# Patient Record
Sex: FEMALE | Race: WHITE | NOT HISPANIC OR LATINO | URBAN - METROPOLITAN AREA
[De-identification: names, ages, dates, MRNs, and addresses within clinical notes are randomized per-mention and may not be internally consistent; named-entity substitution may affect disease eponyms.]

---

## 2020-07-27 ENCOUNTER — APPOINTMENT (RX ONLY)
Dept: URBAN - METROPOLITAN AREA CLINIC 349 | Facility: CLINIC | Age: 25
Setting detail: DERMATOLOGY
End: 2020-07-27

## 2020-07-27 DIAGNOSIS — D22 MELANOCYTIC NEVI: ICD-10-CM

## 2020-07-27 DIAGNOSIS — B07.8 OTHER VIRAL WARTS: ICD-10-CM

## 2020-07-27 PROBLEM — D22.72 MELANOCYTIC NEVI OF LEFT LOWER LIMB, INCLUDING HIP: Status: ACTIVE | Noted: 2020-07-27

## 2020-07-27 PROCEDURE — ? RECOMMENDATIONS

## 2020-07-27 PROCEDURE — ? COUNSELING

## 2020-07-27 PROCEDURE — 99202 OFFICE O/P NEW SF 15 MIN: CPT | Mod: 25

## 2020-07-27 PROCEDURE — 17110 DESTRUCTION B9 LES UP TO 14: CPT

## 2020-07-27 PROCEDURE — ? BENIGN DESTRUCTION

## 2020-07-27 ASSESSMENT — LOCATION DETAILED DESCRIPTION DERM
LOCATION DETAILED: LEFT LATERAL DISTAL PRETIBIAL REGION
LOCATION DETAILED: LEFT KNEE
LOCATION DETAILED: RIGHT ELBOW

## 2020-07-27 ASSESSMENT — LOCATION SIMPLE DESCRIPTION DERM
LOCATION SIMPLE: RIGHT ELBOW
LOCATION SIMPLE: LEFT KNEE
LOCATION SIMPLE: LEFT PRETIBIAL REGION

## 2020-07-27 ASSESSMENT — LOCATION ZONE DERM
LOCATION ZONE: ARM
LOCATION ZONE: LEG

## 2020-07-27 NOTE — PROCEDURE: BENIGN DESTRUCTION
Treatment Number (Will Not Render If 0): 0
Add 52 Modifier (Optional): no
Medical Necessity Information: It is in your best interest to select a reason for this procedure from the list below. All of these items fulfill various CMS LCD requirements except the new and changing color options.
Post-Care Instructions: I reviewed with the patient in detail post-care instructions. Patient is to wear sunprotection, and avoid picking at any of the treated lesions. Pt may apply Vaseline to crusted or scabbing areas.
Detail Level: Detailed
Medical Necessity Clause: This procedure was medically necessary because the lesions that were treated were:
Consent: The patient's consent was obtained including but not limited to risks of crusting, scabbing, blistering, scarring, darker or lighter pigmentary change, recurrence, incomplete removal and infection.

## 2020-08-27 ENCOUNTER — APPOINTMENT (RX ONLY)
Dept: URBAN - METROPOLITAN AREA CLINIC 349 | Facility: CLINIC | Age: 25
Setting detail: DERMATOLOGY
End: 2020-08-27

## 2020-08-27 DIAGNOSIS — L90.5 SCAR CONDITIONS AND FIBROSIS OF SKIN: ICD-10-CM

## 2020-08-27 DIAGNOSIS — B07.8 OTHER VIRAL WARTS: ICD-10-CM | Status: IMPROVED

## 2020-08-27 PROCEDURE — ? RECOMMENDATIONS

## 2020-08-27 PROCEDURE — ? COUNSELING

## 2020-08-27 PROCEDURE — 17110 DESTRUCTION B9 LES UP TO 14: CPT

## 2020-08-27 PROCEDURE — ? BENIGN DESTRUCTION

## 2020-08-27 ASSESSMENT — LOCATION SIMPLE DESCRIPTION DERM
LOCATION SIMPLE: RIGHT ELBOW
LOCATION SIMPLE: LEFT KNEE
LOCATION SIMPLE: LEFT KNEE

## 2020-08-27 ASSESSMENT — LOCATION DETAILED DESCRIPTION DERM
LOCATION DETAILED: LEFT KNEE
LOCATION DETAILED: RIGHT ELBOW
LOCATION DETAILED: LEFT KNEE

## 2020-08-27 ASSESSMENT — LOCATION ZONE DERM
LOCATION ZONE: LEG
LOCATION ZONE: LEG
LOCATION ZONE: ARM

## 2020-08-27 NOTE — PROCEDURE: BENIGN DESTRUCTION
Detail Level: Detailed
Medical Necessity Clause: This procedure was medically necessary because the lesions that were treated were:
Include Z78.9 (Other Specified Conditions Influencing Health Status) As An Associated Diagnosis?: No
Anesthesia Volume In Cc: 0
Consent: The patient's consent was obtained including but not limited to risks of crusting, scabbing, blistering, scarring, darker or lighter pigmentary change, recurrence, incomplete removal and infection.
Medical Necessity Information: It is in your best interest to select a reason for this procedure from the list below. All of these items fulfill various CMS LCD requirements except the new and changing color options.
Post-Care Instructions: I reviewed with the patient in detail post-care instructions. Patient is to wear sunprotection, and avoid picking at any of the treated lesions. Pt may apply Vaseline to crusted or scabbing areas.

## 2020-10-05 ENCOUNTER — APPOINTMENT (RX ONLY)
Dept: URBAN - METROPOLITAN AREA CLINIC 349 | Facility: CLINIC | Age: 25
Setting detail: DERMATOLOGY
End: 2020-10-05

## 2020-10-05 DIAGNOSIS — B07.8 OTHER VIRAL WARTS: ICD-10-CM

## 2020-10-05 DIAGNOSIS — L90.5 SCAR CONDITIONS AND FIBROSIS OF SKIN: ICD-10-CM

## 2020-10-05 PROCEDURE — 17110 DESTRUCTION B9 LES UP TO 14: CPT

## 2020-10-05 PROCEDURE — ? RECOMMENDATIONS

## 2020-10-05 PROCEDURE — ? BENIGN DESTRUCTION

## 2020-10-05 PROCEDURE — 99213 OFFICE O/P EST LOW 20 MIN: CPT | Mod: 25

## 2020-10-05 PROCEDURE — ? COUNSELING

## 2020-10-05 ASSESSMENT — LOCATION ZONE DERM
LOCATION ZONE: LEG
LOCATION ZONE: LEG

## 2020-10-05 ASSESSMENT — LOCATION DETAILED DESCRIPTION DERM
LOCATION DETAILED: LEFT KNEE
LOCATION DETAILED: LEFT KNEE

## 2020-10-05 ASSESSMENT — LOCATION SIMPLE DESCRIPTION DERM
LOCATION SIMPLE: LEFT KNEE
LOCATION SIMPLE: LEFT KNEE

## 2020-10-05 NOTE — PROCEDURE: BENIGN DESTRUCTION
Post-Care Instructions: I reviewed with the patient in detail post-care instructions. Patient is to wear sunprotection, and avoid picking at any of the treated lesions. Pt may apply Vaseline to crusted or scabbing areas.
Render Post-Care Instructions In Note?: no
Detail Level: Detailed
Anesthesia Volume In Cc: 0
Medical Necessity Clause: This procedure was medically necessary because the lesions that were treated were:
Consent: The patient's consent was obtained including but not limited to risks of crusting, scabbing, blistering, scarring, darker or lighter pigmentary change, recurrence, incomplete removal and infection.
Medical Necessity Information: It is in your best interest to select a reason for this procedure from the list below. All of these items fulfill various CMS LCD requirements except the new and changing color options.

## 2021-10-19 ENCOUNTER — TRANSCRIBE ORDER (OUTPATIENT)
Dept: SCHEDULING | Age: 26
End: 2021-10-19

## 2021-10-19 DIAGNOSIS — H54.7 ALTERATION IN VISION: ICD-10-CM

## 2021-10-19 DIAGNOSIS — G43.919 INTRACTABLE MIGRAINE WITHOUT STATUS MIGRAINOSUS: Primary | ICD-10-CM

## 2021-10-19 DIAGNOSIS — R20.0 RIGHT ARM NUMBNESS: ICD-10-CM

## 2021-10-29 ENCOUNTER — HOSPITAL ENCOUNTER (OUTPATIENT)
Dept: MRI IMAGING | Age: 26
Discharge: HOME | End: 2021-10-29
Payer: COMMERCIAL

## 2021-10-29 DIAGNOSIS — H54.7 ALTERATION IN VISION: ICD-10-CM

## 2021-10-29 DIAGNOSIS — R20.0 RIGHT ARM NUMBNESS: ICD-10-CM

## 2021-10-29 DIAGNOSIS — G43.919 INTRACTABLE MIGRAINE WITHOUT STATUS MIGRAINOSUS: ICD-10-CM

## 2021-10-29 PROCEDURE — 70553 MRI BRAIN STEM W/O & W/DYE: CPT

## 2021-10-29 PROCEDURE — A9576 INJ PROHANCE MULTIPACK: HCPCS

## 2021-10-29 PROCEDURE — 74011250636 HC RX REV CODE- 250/636

## 2021-10-29 RX ORDER — SODIUM CHLORIDE 0.9 % (FLUSH) 0.9 %
10 SYRINGE (ML) INJECTION
Status: COMPLETED | OUTPATIENT
Start: 2021-10-29 | End: 2021-10-29

## 2021-10-29 RX ADMIN — Medication 10 ML: at 19:23

## 2021-10-29 RX ADMIN — GADOTERIDOL 15 ML: 279.3 INJECTION, SOLUTION INTRAVENOUS at 19:23

## 2022-02-28 ENCOUNTER — HOSPITAL ENCOUNTER (OUTPATIENT)
Dept: LAB | Age: 27
Discharge: HOME | End: 2022-02-28
Payer: COMMERCIAL

## 2022-02-28 DIAGNOSIS — G43.411 INTRACTABLE HEMIPLEGIC MIGRAINE WITH STATUS MIGRAINOSUS: ICD-10-CM

## 2022-02-28 PROCEDURE — 36415 COLL VENOUS BLD VENIPUNCTURE: CPT

## 2022-02-28 PROCEDURE — 86148 ANTI-PHOSPHOLIPID ANTIBODY: CPT

## 2022-02-28 PROCEDURE — 85302 CLOT INHIBIT PROT C ANTIGEN: CPT

## 2022-03-02 LAB — PROT C AG PPP IA-ACNC: 110 % (ref 60–150)

## 2022-03-08 NOTE — PROGRESS NOTES
Positive anticardiolipin antibody test. Need to start ASA 81 mg a day. I placed a referral to hematology. Called patient but had to leave voice mail.

## 2022-03-09 LAB
CARDIOLIPIN IGA SER IA-ACNC: <9 APL U/ML (ref 0–11)
CARDIOLIPIN IGG SER IA-ACNC: 30 GPL U/ML (ref 0–14)
CARDIOLIPIN IGM SER IA-ACNC: 18 MPL U/ML (ref 0–12)
PE AB, IGA, 823219: 0 U/ML
PE AB, IGG, 823220: 9.7 U/ML
PE AB, IGM, 823221: 10.9 U/ML
PHOSPHATIDYLGLYCEROL IGA, 823222: 1.5 U/ML
PHOSPHATIDYLGLYCEROL IGG, 823223: 14.3 U/ML
PHOSPHATIDYLGLYCEROL IGM, 823224: 3.7 U/ML
PHOSPHATIDYLINOSITOL IGA, 823216: 1.5 U/ML
PHOSPHATIDYLINOSITOL IGG, 823217: 10.8 U/ML
PHOSPHATIDYLINOSITOL IGM, 823218: 4 U/ML
PS IGA SER-ACNC: <1 APS UNITS (ref 0–19)
PS IGG SER-ACNC: 9 UNITS (ref 0–30)
PS IGM SER-ACNC: 108 UNITS (ref 0–30)

## 2022-03-18 ENCOUNTER — HOSPITAL ENCOUNTER (OUTPATIENT)
Dept: LAB | Age: 27
Discharge: HOME | End: 2022-03-18
Payer: COMMERCIAL

## 2022-03-18 PROCEDURE — 36415 COLL VENOUS BLD VENIPUNCTURE: CPT

## 2022-03-18 PROCEDURE — 85305 CLOT INHIBIT PROT S TOTAL: CPT

## 2022-03-18 PROCEDURE — 81241 F5 GENE: CPT

## 2022-03-19 LAB
PROT S AG ACT/NOR PPP IA: 63 % (ref 60–150)
PROT S FREE AG ACT/NOR PPP IA: 84 % (ref 61–136)

## 2022-03-28 LAB — F5 GENE MUT ANL BLD/T: NORMAL

## 2022-04-05 ENCOUNTER — HOSPITAL ENCOUNTER (OUTPATIENT)
Dept: LAB | Age: 27
Discharge: HOME | End: 2022-04-05
Payer: COMMERCIAL

## 2022-04-05 DIAGNOSIS — H53.9 VISUAL CHANGES: ICD-10-CM

## 2022-04-05 DIAGNOSIS — R47.01 APHASIA: ICD-10-CM

## 2022-04-05 DIAGNOSIS — G81.90 HEMIPARESIS, UNSPECIFIED HEMIPARESIS ETIOLOGY, UNSPECIFIED LATERALITY (HCC): ICD-10-CM

## 2022-04-05 DIAGNOSIS — D68.61 APS (ANTIPHOSPHOLIPID SYNDROME) (HCC): ICD-10-CM

## 2022-04-05 DIAGNOSIS — G43.109 COMPLICATED MIGRAINE: ICD-10-CM

## 2022-04-05 LAB
ABO + RH BLD: NORMAL
ALBUMIN SERPL-MCNC: 3.8 G/DL (ref 3.5–5)
ALBUMIN/GLOB SERPL: 1.1 {RATIO} (ref 1.2–3.5)
ALP SERPL-CCNC: 60 U/L (ref 50–136)
ALT SERPL-CCNC: 49 U/L (ref 12–65)
ANION GAP SERPL CALC-SCNC: 5 MMOL/L (ref 7–16)
APTT PPP: 33.9 SEC (ref 24.1–35.1)
AST SERPL-CCNC: 26 U/L (ref 15–37)
BASOPHILS # BLD: 0 K/UL (ref 0–0.2)
BASOPHILS NFR BLD: 0 % (ref 0–2)
BILIRUB SERPL-MCNC: 0.5 MG/DL (ref 0.2–1.1)
BUN SERPL-MCNC: 10 MG/DL (ref 6–23)
CALCIUM SERPL-MCNC: 9.6 MG/DL (ref 8.3–10.4)
CHLORIDE SERPL-SCNC: 105 MMOL/L (ref 98–107)
CO2 SERPL-SCNC: 26 MMOL/L (ref 21–32)
CREAT SERPL-MCNC: 0.8 MG/DL (ref 0.6–1)
CRP SERPL-MCNC: <0.3 MG/DL (ref 0–0.9)
D DIMER PPP FEU-MCNC: 0.64 UG/ML(FEU)
DIFFERENTIAL METHOD BLD: NORMAL
EOSINOPHIL # BLD: 0.1 K/UL (ref 0–0.8)
EOSINOPHIL NFR BLD: 1 % (ref 0.5–7.8)
ERYTHROCYTE [DISTWIDTH] IN BLOOD BY AUTOMATED COUNT: 11.9 % (ref 11.9–14.6)
ERYTHROCYTE [SEDIMENTATION RATE] IN BLOOD: 3 MM/HR (ref 0–20)
FERRITIN SERPL-MCNC: 20 NG/ML (ref 8–388)
FIBRINOGEN PPP-MCNC: 304 MG/DL (ref 190–501)
FOLATE SERPL-MCNC: 14 NG/ML (ref 3.1–17.5)
GLOBULIN SER CALC-MCNC: 3.4 G/DL (ref 2.3–3.5)
GLUCOSE SERPL-MCNC: 114 MG/DL (ref 65–100)
HCT VFR BLD AUTO: 42 % (ref 35.8–46.3)
HGB BLD-MCNC: 14.4 G/DL (ref 11.7–15.4)
IMM GRANULOCYTES # BLD AUTO: 0 K/UL (ref 0–0.5)
IMM GRANULOCYTES NFR BLD AUTO: 0 % (ref 0–5)
INR PPP: 1.1
IRON SATN MFR SERPL: 32 %
IRON SERPL-MCNC: 106 UG/DL (ref 35–150)
LDH SERPL L TO P-CCNC: 161 U/L (ref 100–190)
LYMPHOCYTES # BLD: 2.3 K/UL (ref 0.5–4.6)
LYMPHOCYTES NFR BLD: 23 % (ref 13–44)
Lab: NORMAL
MCH RBC QN AUTO: 31.7 PG (ref 26.1–32.9)
MCHC RBC AUTO-ENTMCNC: 34.3 G/DL (ref 31.4–35)
MCV RBC AUTO: 92.5 FL (ref 79.6–97.8)
MONOCYTES # BLD: 0.5 K/UL (ref 0.1–1.3)
MONOCYTES NFR BLD: 5 % (ref 4–12)
NEUTS SEG # BLD: 7 K/UL (ref 1.7–8.2)
NEUTS SEG NFR BLD: 70 % (ref 43–78)
NRBC # BLD: 0 K/UL (ref 0–0.2)
PLATELET # BLD AUTO: 313 K/UL (ref 150–450)
PMV BLD AUTO: 9.6 FL (ref 9.4–12.3)
POTASSIUM SERPL-SCNC: 3.5 MMOL/L (ref 3.5–5.1)
PROT SERPL-MCNC: 7.2 G/DL (ref 6.3–8.2)
PROTHROMBIN TIME: 13.9 SEC (ref 12.6–14.5)
RBC # BLD AUTO: 4.54 M/UL (ref 4.05–5.2)
REFERENCE LAB,REFLB: NORMAL
SODIUM SERPL-SCNC: 136 MMOL/L (ref 136–145)
TEST DESCRIPTION:,ATST: NORMAL
THROMBIN TIME: 16.4 SECS (ref 15.4–17.9)
TIBC SERPL-MCNC: 327 UG/DL (ref 250–450)
VIT B12 SERPL-MCNC: 268 PG/ML (ref 193–986)
WBC # BLD AUTO: 10 K/UL (ref 4.3–11.1)

## 2022-04-05 PROCEDURE — 85247 CLOT FACTOR VIII MULTIMETRIC: CPT

## 2022-04-05 PROCEDURE — 82607 VITAMIN B-12: CPT

## 2022-04-05 PROCEDURE — 85300 ANTITHROMBIN III ACTIVITY: CPT

## 2022-04-05 PROCEDURE — 80053 COMPREHEN METABOLIC PANEL: CPT

## 2022-04-05 PROCEDURE — 86038 ANTINUCLEAR ANTIBODIES: CPT

## 2022-04-05 PROCEDURE — 85384 FIBRINOGEN ACTIVITY: CPT

## 2022-04-05 PROCEDURE — 85305 CLOT INHIBIT PROT S TOTAL: CPT

## 2022-04-05 PROCEDURE — 85306 CLOT INHIBIT PROT S FREE: CPT

## 2022-04-05 PROCEDURE — 85610 PROTHROMBIN TIME: CPT

## 2022-04-05 PROCEDURE — 85670 THROMBIN TIME PLASMA: CPT

## 2022-04-05 PROCEDURE — 82746 ASSAY OF FOLIC ACID SERUM: CPT

## 2022-04-05 PROCEDURE — 81240 F2 GENE: CPT

## 2022-04-05 PROCEDURE — 85379 FIBRIN DEGRADATION QUANT: CPT

## 2022-04-05 PROCEDURE — 86140 C-REACTIVE PROTEIN: CPT

## 2022-04-05 PROCEDURE — 83615 LACTATE (LD) (LDH) ENZYME: CPT

## 2022-04-05 PROCEDURE — 86769 SARS-COV-2 COVID-19 ANTIBODY: CPT

## 2022-04-05 PROCEDURE — 86037 ANCA TITER EACH ANTIBODY: CPT

## 2022-04-05 PROCEDURE — 85730 THROMBOPLASTIN TIME PARTIAL: CPT

## 2022-04-05 PROCEDURE — 83695 ASSAY OF LIPOPROTEIN(A): CPT

## 2022-04-05 PROCEDURE — 83090 ASSAY OF HOMOCYSTEINE: CPT

## 2022-04-05 PROCEDURE — 36415 COLL VENOUS BLD VENIPUNCTURE: CPT

## 2022-04-05 PROCEDURE — 85652 RBC SED RATE AUTOMATED: CPT

## 2022-04-05 PROCEDURE — 85303 CLOT INHIBIT PROT C ACTIVITY: CPT

## 2022-04-05 PROCEDURE — 82728 ASSAY OF FERRITIN: CPT

## 2022-04-05 PROCEDURE — 83540 ASSAY OF IRON: CPT

## 2022-04-05 PROCEDURE — 85025 COMPLETE CBC W/AUTO DIFF WBC: CPT

## 2022-04-05 PROCEDURE — 86900 BLOOD TYPING SEROLOGIC ABO: CPT

## 2022-04-05 PROCEDURE — 85240 CLOT FACTOR VIII AHG 1 STAGE: CPT

## 2022-04-05 PROCEDURE — 85613 RUSSELL VIPER VENOM DILUTED: CPT

## 2022-04-05 PROCEDURE — 86431 RHEUMATOID FACTOR QUANT: CPT

## 2022-04-06 LAB
ANA SER QL: NEGATIVE
APTT 1H P INC PPP: NORMAL SEC
APTT IMM NP PPP: NORMAL SEC
APTT PPP: 33.9 SEC (ref 23.4–34.5)
C-ANCA TITR SER IF: NORMAL TITER
HCYS SERPL-SCNC: 9.1 UMOL/L (ref 0–14.5)
LPA SERPL-SCNC: 11.2 NMOL/L
P-ANCA ATYPICAL TITR SER IF: NORMAL TITER
P-ANCA TITR SER IF: NORMAL TITER
PTT MIXING STUDY,CMS2: NORMAL
RHEUMATOID FACT SERPL-ACNC: <10 IU/ML (ref 0–15)
SARS-COV-2 ABS, NUCLEOCAPSID: POSITIVE

## 2022-04-07 LAB
AT III AG PPP IA-ACNC: 97 % (ref 72–124)
AT III PPP CHRO-ACNC: 116 % (ref 75–135)
DRVVT MIX, 117894: 49.2 SEC (ref 0–40.4)
DRVVT RATIO 500585: 2.1 RATIO (ref 0.8–1.2)
INTERPRETATION, 117893: ABNORMAL
PROT C PPP-ACNC: 133 % (ref 73–180)
PROT S ACT/NOR PPP: 66 % (ref 63–140)
PROT S AG ACT/NOR PPP IA: 83 % (ref 60–150)
PROT S FREE AG ACT/NOR PPP IA: 87 % (ref 61–136)
SCREEN APTT: 41.2 SEC (ref 0–51.9)
SCREEN DRVVT: 66.5 SEC (ref 0–47)

## 2022-04-08 ENCOUNTER — APPOINTMENT (OUTPATIENT)
Dept: CT IMAGING | Age: 27
End: 2022-04-08
Payer: COMMERCIAL

## 2022-04-08 ENCOUNTER — HOSPITAL ENCOUNTER (EMERGENCY)
Age: 27
Discharge: HOME | End: 2022-04-08
Payer: COMMERCIAL

## 2022-04-08 ENCOUNTER — APPOINTMENT (OUTPATIENT)
Dept: MRI IMAGING | Age: 27
End: 2022-04-08
Payer: COMMERCIAL

## 2022-04-08 VITALS
OXYGEN SATURATION: 99 % | SYSTOLIC BLOOD PRESSURE: 118 MMHG | HEART RATE: 80 BPM | TEMPERATURE: 98.2 F | RESPIRATION RATE: 16 BRPM | DIASTOLIC BLOOD PRESSURE: 80 MMHG

## 2022-04-08 DIAGNOSIS — G43.109 MIGRAINE WITH AURA AND WITHOUT STATUS MIGRAINOSUS, NOT INTRACTABLE: Primary | ICD-10-CM

## 2022-04-08 LAB
ANION GAP SERPL CALC-SCNC: 5 MMOL/L (ref 7–16)
ATRIAL RATE: 93 BPM
BASOPHILS # BLD: 0 K/UL (ref 0–0.2)
BASOPHILS NFR BLD: 0 % (ref 0–2)
BUN SERPL-MCNC: 11 MG/DL (ref 6–23)
CALCIUM SERPL-MCNC: 9 MG/DL (ref 8.3–10.4)
CALCULATED P AXIS, ECG09: 64 DEGREES
CALCULATED R AXIS, ECG10: 71 DEGREES
CALCULATED T AXIS, ECG11: 43 DEGREES
CHLORIDE SERPL-SCNC: 109 MMOL/L (ref 98–107)
CO2 SERPL-SCNC: 23 MMOL/L (ref 21–32)
CREAT SERPL-MCNC: 0.7 MG/DL (ref 0.6–1)
DIAGNOSIS, 93000: NORMAL
DIFFERENTIAL METHOD BLD: ABNORMAL
EOSINOPHIL # BLD: 0.1 K/UL (ref 0–0.8)
EOSINOPHIL NFR BLD: 1 % (ref 0.5–7.8)
ERYTHROCYTE [DISTWIDTH] IN BLOOD BY AUTOMATED COUNT: 11.8 % (ref 11.9–14.6)
GLUCOSE BLD STRIP.AUTO-MCNC: 109 MG/DL (ref 65–100)
GLUCOSE SERPL-MCNC: 102 MG/DL (ref 65–100)
HCT VFR BLD AUTO: 41.8 % (ref 35.8–46.3)
HGB BLD-MCNC: 14.1 G/DL (ref 11.7–15.4)
IMM GRANULOCYTES # BLD AUTO: 0 K/UL (ref 0–0.5)
IMM GRANULOCYTES NFR BLD AUTO: 0 % (ref 0–5)
INR PPP: 0.9
LYMPHOCYTES # BLD: 1.9 K/UL (ref 0.5–4.6)
LYMPHOCYTES NFR BLD: 20 % (ref 13–44)
MCH RBC QN AUTO: 32.1 PG (ref 26.1–32.9)
MCHC RBC AUTO-ENTMCNC: 33.7 G/DL (ref 31.4–35)
MCV RBC AUTO: 95.2 FL (ref 79.6–97.8)
MONOCYTES # BLD: 0.4 K/UL (ref 0.1–1.3)
MONOCYTES NFR BLD: 4 % (ref 4–12)
NEUTS SEG # BLD: 7.4 K/UL (ref 1.7–8.2)
NEUTS SEG NFR BLD: 75 % (ref 43–78)
NRBC # BLD: 0 K/UL (ref 0–0.2)
P-R INTERVAL, ECG05: 146 MS
PLATELET # BLD AUTO: 303 K/UL (ref 150–450)
PMV BLD AUTO: 9.9 FL (ref 9.4–12.3)
POTASSIUM SERPL-SCNC: 4.9 MMOL/L (ref 3.5–5.1)
PROTHROMBIN TIME: 12.3 SEC (ref 12.6–14.5)
Q-T INTERVAL, ECG07: 356 MS
QRS DURATION, ECG06: 76 MS
QTC CALCULATION (BEZET), ECG08: 442 MS
RBC # BLD AUTO: 4.39 M/UL (ref 4.05–5.2)
SERVICE CMNT-IMP: ABNORMAL
SODIUM SERPL-SCNC: 137 MMOL/L (ref 136–145)
TROPONIN-HIGH SENSITIVITY: 3.7 PG/ML (ref 0–14)
VENTRICULAR RATE, ECG03: 93 BPM
WBC # BLD AUTO: 9.9 K/UL (ref 4.3–11.1)

## 2022-04-08 PROCEDURE — 80048 BASIC METABOLIC PNL TOTAL CA: CPT

## 2022-04-08 PROCEDURE — 93005 ELECTROCARDIOGRAM TRACING: CPT

## 2022-04-08 PROCEDURE — 85025 COMPLETE CBC W/AUTO DIFF WBC: CPT

## 2022-04-08 PROCEDURE — 84484 ASSAY OF TROPONIN QUANT: CPT

## 2022-04-08 PROCEDURE — 70450 CT HEAD/BRAIN W/O DYE: CPT

## 2022-04-08 PROCEDURE — 82962 GLUCOSE BLOOD TEST: CPT

## 2022-04-08 PROCEDURE — 70544 MR ANGIOGRAPHY HEAD W/O DYE: CPT

## 2022-04-08 PROCEDURE — 99284 EMERGENCY DEPT VISIT MOD MDM: CPT

## 2022-04-08 PROCEDURE — 85610 PROTHROMBIN TIME: CPT

## 2022-04-08 RX ORDER — SODIUM CHLORIDE 0.9 % (FLUSH) 0.9 %
5-10 SYRINGE (ML) INJECTION AS NEEDED
Status: DISCONTINUED | OUTPATIENT
Start: 2022-04-08 | End: 2022-04-08 | Stop reason: HOSPADM

## 2022-04-08 RX ORDER — SODIUM CHLORIDE 0.9 % (FLUSH) 0.9 %
5-10 SYRINGE (ML) INJECTION EVERY 8 HOURS
Status: DISCONTINUED | OUTPATIENT
Start: 2022-04-08 | End: 2022-04-08 | Stop reason: HOSPADM

## 2022-04-08 NOTE — ED PROVIDER NOTES
Patient with history of complicated migraines. This headache onset 7:30 am with tunnel vision and right sided numbness. Took her Nurtec at onset and symptoms now subsiding. Vomited once on arrival. 10 year history of migraines. Has seen Areli Escobedo. Has Nurtec and Ubrelvy to try. Getting Amiverg injections monthly. Has had 2. Just switched from combination BCP's to just Progesterone. G 0. Just saw Dr. Giana Sweeney and work up initiated. Was advised to come to ER during migraine to get imaging while experiencing symptoms. Is taking Aspirin 81 mg daily. Having nosebleeds but this is not new. Just hiked in [place], has history of allergies. Headaches have increased in frequency in past few months. Has been referred to neuro ophthalmology as well. No past medical history on file. No past surgical history on file. No family history on file. Social History     Socioeconomic History    Marital status: SINGLE     Spouse name: Not on file    Number of children: Not on file    Years of education: Not on file    Highest education level: Not on file   Occupational History    Not on file   Tobacco Use    Smoking status: Never Smoker    Smokeless tobacco: Never Used   Substance and Sexual Activity    Alcohol use: Yes     Alcohol/week: 2.0 standard drinks     Types: 2 Glasses of wine per week    Drug use: Never    Sexual activity: Not on file   Other Topics Concern    Not on file   Social History Narrative    Not on file     Social Determinants of Health     Financial Resource Strain:     Difficulty of Paying Living Expenses: Not on file   Food Insecurity:     Worried About Running Out of Food in the Last Year: Not on file    Tristian of Food in the Last Year: Not on file   Transportation Needs:     Lack of Transportation (Medical): Not on file    Lack of Transportation (Non-Medical):  Not on file   Physical Activity:     Days of Exercise per Week: Not on file    Minutes of Exercise per Session: Not on file   Stress:     Feeling of Stress : Not on file   Social Connections:     Frequency of Communication with Friends and Family: Not on file    Frequency of Social Gatherings with Friends and Family: Not on file    Attends Advent Services: Not on file    Active Member of Clubs or Organizations: Not on file    Attends Club or Organization Meetings: Not on file    Marital Status: Not on file   Intimate Partner Violence:     Fear of Current or Ex-Partner: Not on file    Emotionally Abused: Not on file    Physically Abused: Not on file    Sexually Abused: Not on file   Housing Stability:     Unable to Pay for Housing in the Last Year: Not on file    Number of [place] in the Last Year: Not on file    Unstable Housing in the Last Year: Not on file         ALLERGIES: Pcn [penicillins]    Review of Systems   Constitutional: Negative. HENT: Positive for nosebleeds. Eyes: Positive for visual disturbance. Respiratory: Negative. Cardiovascular: Negative. Gastrointestinal: Positive for nausea and vomiting. Genitourinary: Positive for menstrual problem (heavy in past. longer duration. ). Musculoskeletal: Negative. Skin: Negative. Neurological: Positive for numbness and headaches. Hematological: Negative. Psychiatric/Behavioral: Negative. Vitals:    04/08/22 1003   BP: 124/83   Pulse: (!) 104   Resp: 20   Temp: 98.2 °F (36.8 °C)   SpO2: 100%            Physical Exam  Vitals and nursing note reviewed. Constitutional:       Appearance: Normal appearance. She is well-developed. HENT:      Head: Normocephalic and atraumatic. Right Ear: Tympanic membrane and external ear normal.      Left Ear: Tympanic membrane and external ear normal.      Nose: Nose normal.      Mouth/Throat:      Mouth: Mucous membranes are moist.   Eyes:      General: No scleral icterus. Extraocular Movements: Extraocular movements intact.       Conjunctiva/sclera: Conjunctivae normal. Pupils: Pupils are equal, round, and reactive to light. Neck:      Vascular: No JVD. Cardiovascular:      Rate and Rhythm: Normal rate and regular rhythm. Heart sounds: Normal heart sounds. Pulmonary:      Effort: Pulmonary effort is normal.      Breath sounds: Normal breath sounds. Musculoskeletal:         General: No tenderness. Normal range of motion. Cervical back: Normal range of motion and neck supple. Skin:     General: Skin is warm and dry. Neurological:      General: No focal deficit present. Mental Status: She is alert and oriented to person, place, and time. Psychiatric:         Mood and Affect: Mood normal.         Behavior: Behavior normal.          MDM  Number of Diagnoses or Management Options  Migraine with aura and without status migrainosus, not intractable  Diagnosis management comments: Recurrent migraine headache with tunnel vision and right sided numbness  Labs reviewed  CT normal  Discussed with Dr. Yolanda Mi - will get an MRV. She has had normal MRI with and without contrast in the past.   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 Otherwise normal.  Results and instructions discussed with patient and her mother  Follow up with Neurology and Dr. Dontrell Vaughn. Return with new or worse symptoms.          Amount and/or Complexity of Data Reviewed  Clinical lab tests: ordered and reviewed  Tests in the radiology section of CPT®: ordered and reviewed  Decide to obtain previous medical records or to obtain history from someone other than the patient: yes  Review and summarize past medical records: yes  Discuss the patient with other providers: yes Caridad Runner)    Patient Progress  Patient progress: stable         Procedures No

## 2022-04-08 NOTE — ED NOTES
I have reviewed discharge instructions with the patient. The patient verbalized understanding. Patient left ED via Discharge Method: ambulatory to Home with mother. Opportunity for questions and clarification provided. Patient given 0 scripts. To continue your aftercare when you leave the hospital, you may receive an automated call from our care team to check in on how you are doing. This is a free service and part of our promise to provide the best care and service to meet your aftercare needs.  If you have questions, or wish to unsubscribe from this service please call [phone]. Thank you for Choosing our [address] [place] [address] Emergency Department.

## 2022-04-08 NOTE — ED PROVIDER NOTES
26-year-old female with a history of persistent headache onset 7am this morning with right sided weakness with blurred vision and speech problems some mild confusion. Patient is currently being worked up by hematology and neurology for these headaches. She been diagnosed with hemiplegic headache in the past.  She has had a normal MRI after last episode. The narrative from her office visit 4 days ago as follows:     Ms. Bishnu Mora is a 26-year-old white female with a history of migraines, IBS, and ISIDRA, who was evaluated in consultation by Neurologist, Dr. Kateryna Oh, on 2/28/22 for evaluation of self-identified and frequent baseline severe headaches with complicating neurological features including sensory loss hemiparesis and aphasia. She reported a 10-year history of headaches, but after starting OCP this past August for menstrual migraine and irregular menses, she had a severe episode with stroke like symptoms prompting MRI of the brain on 10/29/21 which was negative for stroke. She reported prior neurology evaluation and treatment with multiple triptans and Nurtec. Labs drawn by Dr. Merced Vizcarra on 2/28/22 included total protein C and cardiolipin and phosphatidyl ab panel. Protein C was normal; however, anticardiolipin antibody test was positive with elevated cardiolipin IGG and IGM, antiphosphatidylserine IgM, and P glycerol IgG. Additional tests on 3/18/22 were negative for factor V Leiden and showed total and free protein S within normal limits. Headache   This is a recurrent problem. The current episode started more than 1 week ago. The problem occurs constantly. The problem has not changed since onset. The headache is aggravated by nothing. The pain is at a severity of 5/10. Associated symptoms include weakness, tingling and visual change. She has tried triptan therapy for the symptoms. The treatment provided no relief. No past medical history on file. No past surgical history on file. No family history on file. Social History     Socioeconomic History    Marital status: SINGLE     Spouse name: Not on file    Number of children: Not on file    Years of education: Not on file    Highest education level: Not on file   Occupational History    Not on file   Tobacco Use    Smoking status: Never Smoker    Smokeless tobacco: Never Used   Substance and Sexual Activity    Alcohol use: Yes     Alcohol/week: 2.0 standard drinks     Types: 2 Glasses of wine per week    Drug use: Never    Sexual activity: Not on file   Other Topics Concern    Not on file   Social History Narrative    Not on file     Social Determinants of Health     Financial Resource Strain:     Difficulty of Paying Living Expenses: Not on file   Food Insecurity:     Worried About Running Out of Food in the Last Year: Not on file    Tristian of Food in the Last Year: Not on file   Transportation Needs:     Lack of Transportation (Medical): Not on file    Lack of Transportation (Non-Medical):  Not on file   Physical Activity:     Days of Exercise per Week: Not on file    Minutes of Exercise per Session: Not on file   Stress:     Feeling of Stress : Not on file   Social Connections:     Frequency of Communication with Friends and Family: Not on file    Frequency of Social Gatherings with Friends and Family: Not on file    Attends Mu-ism Services: Not on file    Active Member of [address] [place] [address] [employer] or Organizations: Not on file    Attends Club or Organization Meetings: Not on file    Marital Status: Not on file   Intimate Partner Violence:     Fear of Current or Ex-Partner: Not on file    Emotionally Abused: Not on file    Physically Abused: Not on file    Sexually Abused: Not on file   Housing Stability:     Unable to Pay for Housing in the Last Year: Not on file    Number of [place] in the Last Year: Not on file    Unstable Housing in the Last Year: Not on file         ALLERGIES: Pcn [penicillins]    Review of Systems   Constitutional: Negative. Negative for activity change. HENT: Negative. Eyes: Negative. Respiratory: Negative. Cardiovascular: Negative. Gastrointestinal: Negative. Genitourinary: Negative. Musculoskeletal: Negative. Skin: Negative. Neurological: Positive for tingling, weakness and headaches. Psychiatric/Behavioral: Negative. All other systems reviewed and are negative. Vitals:    04/08/22 1003   BP: 124/83   Pulse: (!) 104   Resp: 20   Temp: 98.2 °F (36.8 °C)   SpO2: 100%            Physical Exam  Vitals and nursing note reviewed. Constitutional:       General: She is not in acute distress. Appearance: She is well-developed. HENT:      Head: Normocephalic and atraumatic. Right Ear: External ear normal.      Left Ear: External ear normal.      Nose: Nose normal.   Eyes:      General: No scleral icterus. Right eye: No discharge. Left eye: No discharge. Conjunctiva/sclera: Conjunctivae normal.      Pupils: Pupils are equal, round, and reactive to light. Right eye: Pupil is round, reactive and not sluggish. Left eye: Pupil is round, reactive and not sluggish. Cardiovascular:      Rate and Rhythm: Regular rhythm. Pulmonary:      Effort: Pulmonary effort is normal. No respiratory distress. Breath sounds: Normal breath sounds. No stridor. No wheezing or rales. Abdominal:      General: Bowel sounds are normal. There is no distension. Palpations: Abdomen is soft. Tenderness: There is no abdominal tenderness. Musculoskeletal:         General: Normal range of motion. Cervical back: Normal range of motion. Skin:     General: Skin is warm and dry. Findings: No rash. Neurological:      Mental Status: She is alert and oriented to person, place, and time. Motor: No abnormal muscle tone.       Coordination: Coordination normal.   Psychiatric:         Behavior: Behavior normal.          MDM  Number of Diagnoses or Management Options  Migraine with aura and without status migrainosus, not intractable  Diagnosis management comments: Differential diagnosis includes but is not limited to the following: CVA, hemiplegic migraine, TIA, sinus venous thrombosis,    Consultation with radiologist decided MR MRV would be most productive test she is a NIH of 1 or less if she is not a candidate for TPA at this time onset of symptoms 3-1/2 hours. Due to a critical patient in the ER care was transferred to Dr. Britt Michael while awaiting imaging. She consulted neurology who evaluated and patient was discharged.        Amount and/or Complexity of Data Reviewed  Clinical lab tests: ordered and reviewed  Tests in the medicine section of CPT®: ordered and reviewed  Decide to obtain previous medical records or to obtain history from someone other than the patient: yes  Review and summarize past medical records: yes  Independent visualization of images, tracings, or specimens: yes    Risk of Complications, Morbidity, and/or Mortality  Presenting problems: high  Diagnostic procedures: high  Management options: high           Procedures

## 2022-04-08 NOTE — ED TRIAGE NOTES
Pt arrives ambulatory masked with complaints of a migraine x few months. Pt states she went to her hematologist. Pt states she went to her neurologist and was referred to a hematologist for blood clotting issues. Pt was told by hematologist she is an increase risk of a stroke due to her \"lab values\". Pt states has numbness and tingling in her fingers. Pt states this morning her right side became weak, slurred speech and facial drooping. Denies hx of TIA/STROKE Resolved by time of triage.  in triage to evaluate pt. NIHSS in triage 0.

## 2022-04-13 LAB
F2 GENE MUT ANL BLD/T: NORMAL
FACT VIII ACT/NOR PPP: 122 % (ref 56–140)
INTERPRETATION, 910378, CSIR1: NORMAL
VWF AG ACT/NOR PPP IA: 107 % (ref 50–200)
VWF:RCO ACT/NOR PPP PL AGG: 109 % (ref 50–200)

## 2022-05-02 LAB — VWF MULTIMERS PPP IB: NORMAL

## 2022-05-05 ENCOUNTER — HOSPITAL ENCOUNTER (OUTPATIENT)
Dept: CT IMAGING | Age: 27
Discharge: HOME | End: 2022-05-05
Attending: PEDIATRICS
Payer: COMMERCIAL

## 2022-05-05 DIAGNOSIS — G81.90 HEMIPARESIS, UNSPECIFIED HEMIPARESIS ETIOLOGY, UNSPECIFIED LATERALITY (HCC): ICD-10-CM

## 2022-05-05 DIAGNOSIS — H53.9 VISUAL CHANGES: ICD-10-CM

## 2022-05-05 DIAGNOSIS — G43.109 COMPLICATED MIGRAINE: ICD-10-CM

## 2022-05-05 DIAGNOSIS — D68.61 APS (ANTIPHOSPHOLIPID SYNDROME) (HCC): ICD-10-CM

## 2022-05-05 DIAGNOSIS — R47.01 APHASIA: ICD-10-CM

## 2022-05-05 PROCEDURE — 74011000636 HC RX REV CODE- 636: Performed by: PEDIATRICS

## 2022-05-05 PROCEDURE — 74011000258 HC RX REV CODE- 258: Performed by: PEDIATRICS

## 2022-05-05 PROCEDURE — 70496 CT ANGIOGRAPHY HEAD: CPT

## 2022-05-05 RX ORDER — SODIUM CHLORIDE 0.9 % (FLUSH) 0.9 %
10 SYRINGE (ML) INJECTION
Status: COMPLETED | OUTPATIENT
Start: 2022-05-05 | End: 2022-05-05

## 2022-05-05 RX ADMIN — SODIUM CHLORIDE 100 ML: 9 INJECTION, SOLUTION INTRAVENOUS at 16:00

## 2022-05-05 RX ADMIN — Medication 10 ML: at 16:01

## 2022-05-05 RX ADMIN — IOPAMIDOL 50 ML: 755 INJECTION, SOLUTION INTRAVENOUS at 16:00

## 2022-05-20 DIAGNOSIS — G81.90 HEMIPARESIS, UNSPECIFIED HEMIPARESIS ETIOLOGY, UNSPECIFIED LATERALITY (HCC): ICD-10-CM

## 2022-05-20 DIAGNOSIS — D68.61 APS (ANTIPHOSPHOLIPID SYNDROME) (HCC): Primary | ICD-10-CM

## 2022-05-20 DIAGNOSIS — H53.9 VISUAL CHANGES: ICD-10-CM

## 2022-05-20 DIAGNOSIS — G43.109 COMPLICATED MIGRAINE: ICD-10-CM

## 2022-05-20 DIAGNOSIS — R47.01 APHASIA: ICD-10-CM

## 2022-05-23 ENCOUNTER — TELEPHONE (OUTPATIENT)
Dept: NEUROLOGY | Age: 27
End: 2022-05-23

## 2022-05-23 NOTE — TELEPHONE ENCOUNTER
PT father called stating that insurance would not pay for labs drawn on 3-18 from Dr Kim Angeles  Because a diagnosis was not on order . Pt father spoke with Cory Tate in lab and ask if we could fax something with diagnosis on from with Dr name. Asked father if office notes would be ok.  Faxed to [phone] last office notes with cover sheet stating information attention to Cory Tate

## 2022-05-31 DIAGNOSIS — D68.61 APS (ANTIPHOSPHOLIPID SYNDROME) (HCC): Primary | ICD-10-CM

## 2022-06-07 ENCOUNTER — OFFICE VISIT (OUTPATIENT)
Dept: NEUROLOGY | Age: 27
End: 2022-06-07
Payer: COMMERCIAL

## 2022-06-07 ENCOUNTER — HOSPITAL ENCOUNTER (OUTPATIENT)
Dept: LAB | Age: 27
Discharge: HOME | End: 2022-06-10
Payer: COMMERCIAL

## 2022-06-07 ENCOUNTER — OFFICE VISIT (OUTPATIENT)
Dept: ONCOLOGY | Age: 27
End: 2022-06-07
Payer: COMMERCIAL

## 2022-06-07 VITALS
TEMPERATURE: 98.7 F | SYSTOLIC BLOOD PRESSURE: 120 MMHG | WEIGHT: 175.4 LBS | BODY MASS INDEX: 29.19 KG/M2 | HEART RATE: 86 BPM | DIASTOLIC BLOOD PRESSURE: 90 MMHG | RESPIRATION RATE: 14 BRPM | OXYGEN SATURATION: 97 %

## 2022-06-07 VITALS
BODY MASS INDEX: 29.16 KG/M2 | OXYGEN SATURATION: 100 % | HEIGHT: 65 IN | DIASTOLIC BLOOD PRESSURE: 83 MMHG | SYSTOLIC BLOOD PRESSURE: 122 MMHG | HEART RATE: 81 BPM | WEIGHT: 175 LBS

## 2022-06-07 DIAGNOSIS — R76.0 ANTICARDIOLIPIN ANTIBODY POSITIVE: Primary | ICD-10-CM

## 2022-06-07 DIAGNOSIS — G43.701 CHRONIC MIGRAINE WITHOUT AURA WITH STATUS MIGRAINOSUS, NOT INTRACTABLE: Primary | ICD-10-CM

## 2022-06-07 DIAGNOSIS — D68.61 APS (ANTIPHOSPHOLIPID SYNDROME) (HCC): ICD-10-CM

## 2022-06-07 LAB
ALBUMIN SERPL-MCNC: 4 G/DL (ref 3.5–5)
ALBUMIN/GLOB SERPL: 1.3 {RATIO} (ref 1.2–3.5)
ALP SERPL-CCNC: 56 U/L (ref 50–136)
ALT SERPL-CCNC: 40 U/L (ref 12–65)
ANION GAP SERPL CALC-SCNC: 6 MMOL/L (ref 7–16)
AST SERPL-CCNC: 19 U/L (ref 15–37)
BASOPHILS # BLD: 0.1 K/UL (ref 0–0.2)
BASOPHILS NFR BLD: 1 % (ref 0–2)
BILIRUB SERPL-MCNC: 0.4 MG/DL (ref 0.2–1.1)
BUN SERPL-MCNC: 11 MG/DL (ref 6–23)
CALCIUM SERPL-MCNC: 9.2 MG/DL (ref 8.3–10.4)
CHLORIDE SERPL-SCNC: 107 MMOL/L (ref 98–107)
CO2 SERPL-SCNC: 26 MMOL/L (ref 21–32)
CREAT SERPL-MCNC: 0.8 MG/DL (ref 0.6–1)
DIFFERENTIAL METHOD BLD: NORMAL
EOSINOPHIL # BLD: 0.1 K/UL (ref 0–0.8)
EOSINOPHIL NFR BLD: 1 % (ref 0.5–7.8)
ERYTHROCYTE [DISTWIDTH] IN BLOOD BY AUTOMATED COUNT: 12.2 % (ref 11.9–14.6)
GLOBULIN SER CALC-MCNC: 3.2 G/DL (ref 2.3–3.5)
GLUCOSE SERPL-MCNC: 89 MG/DL (ref 65–100)
HCT VFR BLD AUTO: 41.9 % (ref 35.8–46.3)
HGB BLD-MCNC: 14.3 G/DL (ref 11.7–15.4)
IMM GRANULOCYTES # BLD AUTO: 0 K/UL (ref 0–0.5)
IMM GRANULOCYTES NFR BLD AUTO: 0 % (ref 0–5)
LYMPHOCYTES # BLD: 3 K/UL (ref 0.5–4.6)
LYMPHOCYTES NFR BLD: 31 % (ref 13–44)
MCH RBC QN AUTO: 31.6 PG (ref 26.1–32.9)
MCHC RBC AUTO-ENTMCNC: 34.1 G/DL (ref 31.4–35)
MCV RBC AUTO: 92.5 FL (ref 79.6–97.8)
MONOCYTES # BLD: 0.6 K/UL (ref 0.1–1.3)
MONOCYTES NFR BLD: 7 % (ref 4–12)
NEUTS SEG # BLD: 5.8 K/UL (ref 1.7–8.2)
NEUTS SEG NFR BLD: 60 % (ref 43–78)
NRBC # BLD: 0 K/UL (ref 0–0.2)
PLATELET # BLD AUTO: 330 K/UL (ref 150–450)
PMV BLD AUTO: 9.6 FL (ref 9.4–12.3)
POTASSIUM SERPL-SCNC: 3.9 MMOL/L (ref 3.5–5.1)
PROT SERPL-MCNC: 7.2 G/DL (ref 6.3–8.2)
RBC # BLD AUTO: 4.53 M/UL (ref 4.05–5.2)
SODIUM SERPL-SCNC: 139 MMOL/L (ref 136–145)
WBC # BLD AUTO: 9.7 K/UL (ref 4.3–11.1)

## 2022-06-07 PROCEDURE — 85025 COMPLETE CBC W/AUTO DIFF WBC: CPT

## 2022-06-07 PROCEDURE — 86147 CARDIOLIPIN ANTIBODY EA IG: CPT

## 2022-06-07 PROCEDURE — 99214 OFFICE O/P EST MOD 30 MIN: CPT | Performed by: NURSE PRACTITIONER

## 2022-06-07 PROCEDURE — 36415 COLL VENOUS BLD VENIPUNCTURE: CPT

## 2022-06-07 PROCEDURE — 80053 COMPREHEN METABOLIC PANEL: CPT

## 2022-06-07 PROCEDURE — 99215 OFFICE O/P EST HI 40 MIN: CPT | Performed by: PEDIATRICS

## 2022-06-07 RX ORDER — ERENUMAB-AOOE 70 MG/ML
70 INJECTION SUBCUTANEOUS ONCE
Qty: 1 ML | Refills: 11 | Status: SHIPPED | OUTPATIENT
Start: 2022-06-07 | End: 2022-06-07

## 2022-06-07 RX ORDER — UBROGEPANT 50 MG/1
TABLET ORAL
COMMUNITY
Start: 2022-03-01 | End: 2022-06-07 | Stop reason: SDUPTHER

## 2022-06-07 RX ORDER — UBROGEPANT 50 MG/1
50 TABLET ORAL DAILY PRN
Qty: 16 TABLET | Refills: 11 | Status: SHIPPED | OUTPATIENT
Start: 2022-06-07

## 2022-06-07 ASSESSMENT — PATIENT HEALTH QUESTIONNAIRE - PHQ9
1. LITTLE INTEREST OR PLEASURE IN DOING THINGS: 0
SUM OF ALL RESPONSES TO PHQ QUESTIONS 1-9: 0
SUM OF ALL RESPONSES TO PHQ QUESTIONS 1-9: 0
SUM OF ALL RESPONSES TO PHQ9 QUESTIONS 1 & 2: 0
SUM OF ALL RESPONSES TO PHQ QUESTIONS 1-9: 0
2. FEELING DOWN, DEPRESSED OR HOPELESS: 0
SUM OF ALL RESPONSES TO PHQ QUESTIONS 1-9: 0
2. FEELING DOWN, DEPRESSED OR HOPELESS: 0
SUM OF ALL RESPONSES TO PHQ QUESTIONS 1-9: 0
1. LITTLE INTEREST OR PLEASURE IN DOING THINGS: 0
SUM OF ALL RESPONSES TO PHQ QUESTIONS 1-9: 0
SUM OF ALL RESPONSES TO PHQ9 QUESTIONS 1 & 2: 0
SUM OF ALL RESPONSES TO PHQ QUESTIONS 1-9: 0
SUM OF ALL RESPONSES TO PHQ QUESTIONS 1-9: 0

## 2022-06-07 NOTE — PROGRESS NOTES
[facility] Neurology [facility]  [address]  [address] [facility], [address] [facility]      Chief Complaint   Patient presents with    Migraine    Follow-up       Kerrie Sales is a 28 y.o. female who presents for follow up for migraine. She was previously evaluated by Dr. Terell Bourne, who stated the following:    Crystal Curtis is a 31 y.o. female who is being seen for migraine. 10 year history of headache. Had severe episode with stroke like symptoms. Had MRI brain which was negative.      Previous neurology evaluation. Prior treatment has included symptomatic medications (multiple triptans and Nurtec).    The patient reports daily headache. She has major attacks where she has to leave work a couple of times a year. Duration of severe arttacks 6 hours. She has never been on preventive medications.      Has only had neurological symptoms this past August and that is after going on OCP's which was for dual indication of menstrual migraine and irregular menses. Orsythia. OCP. On Prozac for anxiety. Has more headaches around menses.     The daily headache is described as beginning on awakening. It is located frontally and occipitally. The pain is described as pulsatile. Improves with hot shower. Worsens with physical activy. Associated symptoms include nausea sometimes, sensitivity to light and noise. 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     The less common headache begins with numbness on one side of the body, changes in vision for 30 minutes. She then vomits and slowly develops incapacitating that are throbbing, may be temporal or pancephalic. With the index HA in August she had significant communication deficits and confusion. .      Denies frequent use of triptans or over-the-counter analgesics stating \"they do not work\"    Interval history:  She is here today by herself. Denies headache. Headache frequency has improved.  She is currently taking Aimovig 70 mg monthly Organization Meetings: Not on file    Marital Status: Not on file   Intimate Partner Violence:     Fear of Current or Ex-Partner: Not on file    Emotionally Abused: Not on file    Physically Abused: Not on file    Sexually Abused: Not on file   Housing Stability:     Unable to Pay for Housing in the Last Year: Not on file    Number of Seun in the Last Year: Not on file    Unstable Housing in the Last Year: Not on file         Current Outpatient Medications:     aspirin 81 MG EC tablet, Take by mouth daily, Disp: , Rfl:     Erenumab-aooe (AIMOVIG) 70 MG/ML SOAJ, Inject 70 mg into the skin once, Disp: , Rfl:     FLUoxetine (PROZAC) 10 MG capsule, Take 1 capsule by mouth daily, Disp: , Rfl:     levonorgestrel-ethinyl estradiol (ORSYTHIA) 0.1-20 MG-MCG per tablet, Take 1 tablet by mouth daily, Disp: , Rfl:     norethindrone (MICRONOR) 0.35 MG tablet, Take by mouth, Disp: , Rfl:     Rimegepant Sulfate (NURTEC) 75 MG TBDP, Take 1 tablet by mouth as needed, Disp: , Rfl:     UBRELVY 50 MG TABS, TAKE 1 TABLET BY MOUTH ONCE A DAY AS NEEDED FOR MIGRAINE FOR UP TO 1 DOSE, Disp: , Rfl:     Allergies   Allergen Reactions    Penicillins Other (See Comments)       REVIEW OF SYSTEMS:  CONSTITUTIONAL: No weight loss, fever, chills, weakness or fatigue. HEENT: Eyes: No visual loss, blurred vision, double vision or yellow sclerae. Ears, Nose, Throat: No hearing loss, sneezing, congestion, runny nose or sore throat. SKIN: No rash or itching. CARDIOVASCULAR: No chest pain, chest pressure or chest discomfort. No palpitations or edema. RESPIRATORY: No shortness of breath, cough or sputum. GASTROINTESTINAL: No anorexia, nausea, vomiting or diarrhea. No abdominal pain or blood. GENITOURINARY: no burning with urination. NEUROLOGICAL: No headache, dizziness, syncope, paralysis, ataxia, numbness or tingling in the extremities. No change in bowel or bladder control.   MUSCULOSKELETAL: No muscle, back pain, joint pain or stiffness. HEMATOLOGIC: No anemia, bleeding or bruising. LYMPHATICS: No enlarged nodes. No history of splenectomy. PSYCHIATRIC: No history of depression or anxiety. ENDOCRINOLOGIC: No reports of sweating, cold or heat intolerance. No polyuria or polydipsia. ALLERGIES: No history of asthma, hives, eczema or rhinitis. Physical Examination  /83 (Site: Left Upper Arm, Position: Sitting, Cuff Size: Large Adult)   Pulse 81   Ht 5' 5\" (1.651 m)   Wt 175 lb (79.4 kg)   SpO2 100%   BMI 29.12 kg/m²     General - Well developed, well nourished, in no apparent distress. Pleasant and conversent. HEENT - Normocephalic, atraumatic. Conjunctiva, tympanic membranes, and oropharynx are clear. Neck - Supple without masses, no bruits   Cardiovascular - Regular rate and rhythm. Normal S1, S2 without murmurs, rubs, or gallops. Lungs - Clear to auscultation. Abdomen - Soft, nontender with normal bowel sounds. Extremities - Peripheral pulses intact. No edema and no rashes. Neurological examination - Comprehension, attention , memory and reasoning are intact. Language and speech are normal. On cranial nerve examination pupils are equal round and reactive to light. Fundoscopic examination is normal. Visual acuity is adequate. Visual fields are full to finger confrontation. Extraocular motility is normal. Face is symmetric and sensation is intact to light touch. Hearing is intact to finger rustle bilaterally. Motor examination - There is normal muscle tone and bulk. Power is full throughout. Muscle stretch reflexes are normoactive and there are no pathological reflexes present. Sensation is intact to light touch, pinprick, vibration and proprioception in all extremities. Cerebellar examination is normal. Gait and stance are normal.       Diagnoses and all orders for this visit:    Chronic migraine without aura with status migrainosus, not intractable  Stable.  Continue Aimovig 70 mg monthly injections for migraine prevention. Continue Ubrelvy 50 mg daily as needed for migraine abortive therapy. May repeat for 1 dose in 2 hours if needed. She has tried triptan therapy in the past, headaches worsened and unable to tolerate due to side effects. Headache Education:   Instructed the patient on over-the-counter headache management medications including: CoQ10, magnesium oxide, and butterbur. To avoid a pain medication overuse headache trying not to take pain medicines more than 3 doses a week. To help relieve headache symptoms without taking pain medicine lie down under darkroom and drink glass of water. Consider lifestyle modification including good sleep hygiene, routine medial schedules, regular exercise and managing triggers. Keep a headache diary  to reveal triggers and possible patterns. Triggers may be: Food, stress, perfumes, alcohol, or even chocolate. Drink plenty of water and try to get 8 hours of sleep each night to reduce risk factors that may cause headaches. -     UBRELVY 50 MG TABS; Take 50 mg by mouth daily as needed (for migraine abortive therapy. May repeat for 1 dose in 2 hours if needed.)  -     Erenumab-aooe (AIMOVIG) 70 MG/ML SOAJ; Inject 70 mg into the skin once for 1 dose    Follow up in 1 year or sooner if needed. I spent greater than 50% of the 45 total minutes of today's visit in coordination of care and patient/family education and counseling regarding the above patient concerns, reviewing the patient's medical record, my assessment and recommendations.         Sunday Kennedy, [facility]7 [address] Neurology [facility]  [address] [facility], [address] [place] [address]  [id number]:[phone]

## 2022-06-07 NOTE — PROGRESS NOTES
HISTORY OF PRESENT ILLNESS  Ephraim Torres is a 28 y.o. female referred for APS  Reason for Referral: Antiphospholipid antibody syndrome     Referring Provider: Jasmyn Vergara MD     Primary Care Provider: Ana Avalos NP     Family History of Cancer/Hematologic Disorders: Family history significant for maternal grandmother with skin cancer.      Presenting Symptoms: Severe headaches, sensory loss hemiparesis and aphasia     Narrative with recent with Results/Procedures/Biopsies and Dates completed: Ms. Janice Bueno is a 43-year-old white female with a history of migraines, IBS, and RUBY, who was evaluated in consultation by Neurologist, Dr. Celeste Royal, on 2/28/22 for evaluation of self-identified and frequent baseline severe headaches with complicating neurological features including sensory loss hemiparesis and aphasia. She reported a 10-year history of headaches, but after starting OCP this past August for menstrual migraine and irregular menses, she had a severe episode with stroke like symptoms prompting MRI of the brain on 10/29/21 which was negative for stroke. She reported prior neurology evaluation and treatment with multiple triptans and Nurtec. Labs drawn by Dr. Rocael Ramirez on 2/28/22 included total protein C and cardiolipin and phosphatidyl ab panel. Protein C was normal; however, anticardiolipin antibody test was positive with elevated cardiolipin IGG and IGM, antiphosphatidylserine IgM, and P glycerol IgG. Additional tests on 3/18/22 were negative for factor V Leiden and showed total and free protein S within normal limits. Patient was started on 81 mg of ASA daily, and referral was placed to [facility] for hematology evaluation and advise concerning whether to treat patient with [address] or antiplatelet. Of note, GYN discontinued Orsythia on 3/15/22 and started patient on Micronor.      BRAIN MRI 10/29/2021  FINDINGS: Normal size of ventricles and extra-axial spaces for age. Normal cerebellar tonsils.  Normal pituitary morphology. Normal cerebral parenchyma signal for age. No evidence of acute or recent infarct. No evidence of recent hemorrhage. Normal vascular flow voids. Incidental developmental venous anomaly in the right temporal occipital region. No calvarial abnormality is seen. Grossly normal orbital structures. Essentially clear paranasal sinuses and mastoid air cells. No abnormality of extracranial soft tissues.    IMPRESSION  1.  No abnormality on MR brain to explain headaches.       2/28/2022 10:23 3/18/2022 10:00   FACTOR V LEIDEN   Result: [id number]>A ([id number]) - Not Detected   PROTEIN C, TOTAL 110     Protein S, Total   63   Protein S, Free   84      CARDIOLIPIN AND PHOSPHATIDYL AB PANEL 2/28/22    Ref Range & Units 2/28/22 1023 Resulting Agency   Cardiolipin Ab, IgG 0 - 14 GPL U/mL 30 High   [facility]   Cardiolipin Ab, IgM 0 - 12 MPL U/mL 18 High   [facility]   Cardiolipin Ab, IgA 0 - 11 APL U/mL <9  [facility]   Antiphosphatidylserine IgM 0 - 30 Units 108 High   [facility]   Antiphosphatidylserine IgA 0 - 19 APS Units <1  [facility]   Antiphosphatidylserine IgG 0 - 30 Units 9  [facility]   P ethanolamine Ab, IgA <12.0 U/mL 0.0  [facility]   P ethanolamine Ab, IgG <12.0 U/mL 9.7  [facility]   P ethanolamine Ab, IgM <12.0 U/mL 10.9  [facility]   P glycerol Ab, IgA <12.0 U/mL 1.5  [facility]   P glycerol Ab, IgG <12.0 U/mL 14.3 High   [employer] glycerol Ab, IgM <12.0 U/mL 3.7  [facility]   P inositol Ab, IgA <12.0 U/mL 1.5  [employer] [facility] inositol Ab, IgG <12.0 U/mL 10.8  [facility]   P inositol Ab, IgM <12.0 U/mL 4.0            Notes from Referring Provider: Hemiplegic migraine attacks with positive anti cardiolipin ab IGG and IGM anti phosphotidil serine IGm. ? [address] or antiplatelet. Patient started on ASA.      Other Pertinent Information: 01/22/2021 (COVID-19, Pfizer Purple top, DILUTE for use, 12+ yrs, 30mcg/0.3mL dose)  02/10/2021 (COVID-19, Pfizer Purple top, DILUTE for use, 12+ yrs, 30mcg/0.3mL dose)  10/08/2021 (COVID-19, Pfizer Purple top, DILUTE for use, 12+ yrs, 30mcg/0.3mL dose)     Presented at Tumor Board:  N/A      HPI migraine for years but recently gotten worse over last year, numbness and paralysis in 2010, on combined estrogen and progesterone in HS and maybe slightly better; recently seen by neurology, and MRI normal, and labwork showed increased abs including IGM PATRICIA; Speech issues with migraine in last 9-10 months, 2-3 x year  HA daily thow, aimovig  Started low dose 81 mg but now with daily nose bleeds  Teaches and most recent HA, 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  norethindrone (Norlyda) 0.35 mg tab Take  by mouth.  aspirin delayed-release 81 mg tablet Take  by mouth daily.  FLUoxetine (PROzac) 10 mg capsule Take 1 Capsule by mouth daily.  rimegepant (Nurtec ODT) 75 mg disintegrating tablet Take 1 Tablet by mouth as needed.  Orsythia 0.1-20 mg-mcg tab Take 1 Tablet by mouth daily. (Patient not taking: Reported on 4/5/2022)     No current facility-administered medications for this visit. No past medical history on file. No past surgical history on file. No family history on file. No past surgical history on file. Social History     Tobacco Use    Smoking status: Never Smoker    Smokeless tobacco: Never Used   Substance Use Topics    Alcohol use:  Yes     Alcohol/week: 2.0 standard drinks     Types: 2 Glasses of wine per week    Drug use: Never     Immunization History   Administered Date(s) Administered    COVID-19, Pfizer Purple top, DILUTE for use, 12+ yrs, 30mcg/0.3mL dose 02/10/2021, 10/08/2021     Allergies   Allergen Reactions    Pcn [Penicillins] Unknown (comments)     No h/o pregnancy  No smoke  ETOH  No STDs  No blurred vision  ophtho a year ago,       ROS  Review of Systems   Constitutional: Negative. HENT: Negative. Eyes: Negative. Respiratory: Negative. Cardiovascular: Negative. Gastrointestinal: Negative. Genitourinary: Negative. Musculoskeletal: Negative. Skin: Negative. Neurological: Negative. Endo/Heme/Allergies: Negative. Psychiatric/Behavioral: Negative. Pain reviewed fully and addressed this visit  Med review and reconciliation addressed fully this visit  ADLs and performance level addressed, ECOG 0 unless otherwise addressed    Personal History  No DVT  No PE  No unexplained swelling  No recurrent chest pain or SOB  No severe headache  No pregnancy loss (when applicable)    Family History  No DVT  No PE  No unexplained swelling  No recurrent chest pain or SOB  No severe headache  No pregnancy loss (when applicable)    Risk Factors  Smoking      N  Oral Contraceptives  Y   noreth. Obesity      N  Inflammation   Y   Flushing in chest  Steroids      N    Never pregnant  No family history of multiple pregnancy loss or stroke or early MI  No VTE family history    Visit Vitals  /89   Pulse 86   Temp 99.1 °F (37.3 °C)   Resp 16   Ht 5' 5\" (1.651 m)   Wt 174 lb 12.8 oz (79.3 kg)   SpO2 99%   BMI 29.09 kg/m²         Physical Exam  Constitutional: Well developed, well nourished female in no acute distress, sitting comfortably   HEENT: Normocephalic and atraumatic. Oropharynx is clear, mucous membranes are moist.  Pupils are equal, round, and reactive to light. Extraocular muscles are intact. Sclerae anicteric. Neck supple without JVD. No thyromegaly present. Lymph node   No palpable submandibular, cervical, supraclavicular, axillary or inguinal lymph nodes. Skin Warm and dry.   No bruising and no rash noted. No erythema. No pallor. Respiratory Lungs are clear to auscultation bilaterally without wheezes, rales or rhonchi, normal air exchange without accessory muscle use. CVS Normal rate, regular rhythm and normal S1 and S2. No murmurs, gallops, or rubs. Abdomen Soft, nontender and nondistended, normoactive bowel sounds. No palpable mass. No hepatosplenomegaly. Neuro CN II-XII intact, no dysmetria, DTR symmetric, normal strength, sensation Grossly nonfocal with no obvious sensory or motor deficits. MSK Normal range of motion in general.  No edema and no tenderness. PS ECOG = 0   Psych Appropriate mood and affect. No results found for this or any previous visit (from the past 24 hour(s)). ASSESSMENT and PLAN    ICD-10-CM ICD-9-CM    1. APS (antiphospholipid syndrome) (HCC)  D68.61 289.81 CTA HEAD      REFERRAL TO OPHTHALMOLOGY      [facility]      MISC. LAB TEST      MISC. LAB TEST      [facility] [place]      BLOOD TYPE, (ABO+RH)      ANTITHROMBIN III PANEL      PROTEIN S AG,FREE + TOTAL      PROTEIN C ACTIVITY      THROMBIN TIME      VON WILLEBRAND PANEL      MIXING STUDY, APTT      D DIMER      FIBRINOGEN      PROTHROMBIN TIME + INR      PTT      LIPOPROTEIN (A)      HOMOCYSTEINE, PLASMA      METABOLIC PANEL, COMPREHENSIVE      CBC WITH AUTOMATED DIFF      LUPUS ANTICOAGULANT PANEL W/ REFLEX      PROTEIN S, FUNCTIONAL      FACTOR II  DNA ANALYSIS      SARS-COV-2 AB, TOTAL      FERRITIN      TRANSFERRIN SATURATION      C REACTIVE PROTEIN, QT      SED RATE, AUTOMATED      LD      RON, DIRECT, W/REFLEX      CANCELED: RHEUMATOID FACTOR, QL      CANCELED: VITAMIN B12      CANCELED: FOLATE      CANCELED: VON WILLEBRAND MULTIMERS      CANCELED: ANTI-NEUTROPHIL CYTOPLASMIC AB   2. Complicated migraine  L01.178 346.00 CTA HEAD      REFERRAL TO OPHTHALMOLOGY      [facility]      MISC. LAB TEST      [facility]. LAB TEST      [facility]      BLOOD TYPE, (ABO+RH)      ANTITHROMBIN III PANEL PROTEIN S AG,FREE + TOTAL      PROTEIN C ACTIVITY      THROMBIN TIME      VON WILLEBRAND PANEL      MIXING STUDY, APTT      D DIMER      FIBRINOGEN      PROTHROMBIN TIME + INR      PTT      LIPOPROTEIN (A)      HOMOCYSTEINE, PLASMA      METABOLIC PANEL, COMPREHENSIVE      CBC WITH AUTOMATED DIFF      LUPUS ANTICOAGULANT PANEL W/ REFLEX      PROTEIN S, FUNCTIONAL      FACTOR II  DNA ANALYSIS      SARS-COV-2 AB, TOTAL      FERRITIN      TRANSFERRIN SATURATION      C REACTIVE PROTEIN, QT      SED RATE, AUTOMATED      LD      RON, DIRECT, W/REFLEX      CANCELED: RHEUMATOID FACTOR, QL      CANCELED: VITAMIN B12      CANCELED: FOLATE      CANCELED: VON WILLEBRAND MULTIMERS      CANCELED: ANTI-NEUTROPHIL CYTOPLASMIC AB   3. Aphasia  R47.01 784.3 CTA HEAD      REFERRAL TO OPHTHALMOLOGY      MISC. LAB TEST      MISC. LAB TEST      MISC. LAB TEST      MISC. LAB TEST      BLOOD TYPE, (ABO+RH)      ANTITHROMBIN III PANEL      PROTEIN S AG,FREE + TOTAL      PROTEIN C ACTIVITY      THROMBIN TIME      VON WILLEBRAND PANEL      MIXING STUDY, APTT      D DIMER      FIBRINOGEN      PROTHROMBIN TIME + INR      PTT      LIPOPROTEIN (A)      HOMOCYSTEINE, PLASMA      METABOLIC PANEL, COMPREHENSIVE      CBC WITH AUTOMATED DIFF      LUPUS ANTICOAGULANT PANEL W/ REFLEX      PROTEIN S, FUNCTIONAL      FACTOR II  DNA ANALYSIS      SARS-COV-2 AB, TOTAL      FERRITIN      TRANSFERRIN SATURATION      C REACTIVE PROTEIN, QT      SED RATE, AUTOMATED      LD      RON, DIRECT, W/REFLEX      CANCELED: RHEUMATOID FACTOR, QL      CANCELED: VITAMIN B12      CANCELED: FOLATE      CANCELED: VON WILLEBRAND MULTIMERS      CANCELED: ANTI-NEUTROPHIL CYTOPLASMIC AB   4. Hemiparesis, unspecified hemiparesis etiology, unspecified laterality ([facility]  G81.90 342.90 CTA HEAD      REFERRAL TO OPHTHALMOLOGY      MISC. LAB TEST      MISC. LAB TEST      MISC. LAB TEST      MISC.  LAB TEST      BLOOD TYPE, (ABO+RH)      ANTITHROMBIN III PANEL      PROTEIN S AG,FREE + TOTAL      PROTEIN C ACTIVITY      THROMBIN TIME      VON WILLEBRAND PANEL      MIXING STUDY, APTT      D DIMER      FIBRINOGEN      PROTHROMBIN TIME + INR      PTT      LIPOPROTEIN (A)      HOMOCYSTEINE, PLASMA      METABOLIC PANEL, COMPREHENSIVE      CBC WITH AUTOMATED DIFF      LUPUS ANTICOAGULANT PANEL W/ REFLEX      PROTEIN S, FUNCTIONAL      FACTOR II  DNA ANALYSIS      SARS-COV-2 AB, TOTAL      FERRITIN      TRANSFERRIN SATURATION      C REACTIVE PROTEIN, QT      SED RATE, AUTOMATED      LD      RON, DIRECT, W/REFLEX      CANCELED: RHEUMATOID FACTOR, QL      CANCELED: VITAMIN B12      CANCELED: FOLATE      CANCELED: VON WILLEBRAND MULTIMERS      CANCELED: ANTI-NEUTROPHIL CYTOPLASMIC AB   5. Visual changes  H53.9 368.9 CTA HEAD      REFERRAL TO OPHTHALMOLOGY      [facility]      MISC. LAB TEST      MISC. LAB TEST      MISC. LAB TEST      BLOOD TYPE, (ABO+RH)      ANTITHROMBIN III PANEL      PROTEIN S AG,FREE + TOTAL      PROTEIN C ACTIVITY      THROMBIN TIME      VON WILLEBRAND PANEL      MIXING STUDY, APTT      D DIMER      FIBRINOGEN      PROTHROMBIN TIME + INR      PTT      LIPOPROTEIN (A)      HOMOCYSTEINE, PLASMA      METABOLIC PANEL, COMPREHENSIVE      CBC WITH AUTOMATED DIFF      LUPUS ANTICOAGULANT PANEL W/ REFLEX      PROTEIN S, FUNCTIONAL      FACTOR II  DNA ANALYSIS      SARS-COV-2 AB, TOTAL      FERRITIN      TRANSFERRIN SATURATION      C REACTIVE PROTEIN, QT      SED RATE, AUTOMATED      LD      RON, DIRECT, W/REFLEX      CANCELED: RHEUMATOID FACTOR, QL      CANCELED: VITAMIN B12      CANCELED: FOLATE      CANCELED: VON WILLEBRAND MULTIMERS      CANCELED: ANTI-NEUTROPHIL CYTOPLASMIC AB      27 yo with longstanding complex migraines with recent change in stigmata with speech issues,  and work up including negative MRI and + Aps screen for PATRICIA ab, needs repeat.   Pt. Does not meet APS criteria per revised [place] classification and if still positive repeat PATRICIA, then I stil/vessel abnormality I would subjest her to any more sign. Anticoagulation, more than perhaps baby ASA, which is already causing increased nose bleeds. Change in current therapy is helping with daily ha and hopefully severity of migraines will be reduced with neurology efforts. PATRICIA +: repeat at 12 weeks, follow up 1 month  -complete thrombophilia work up   Complicated Migraines: ANCA studies, CTA brain  -follow up neurology  Epistaxis: bleeding diathesis work up and consider ENT referral for cautery (has had to have as adolescent)  -d/w patient impact of PATRICIA abs on pregnancy and if APS, then potential obstetric complications. Follow up 1 month    6/7/22  Maybe a bit more tingling, but overall stable  On baby asa 81 mg  All work up negative re: APS except awaiting repeat Lupus Anticoagulant and PATRICIA on today's visit  -if + will follow up 6-12 months  -if -, reassurance and call PRN  Fully reviewed APS and diagnostic criteria and do not think she qualifies for dx; reviewed CTA  Answered all questions; continue neuro follow up  Answered all questions  Total time 45 min 50% in direct consultation about the patient's diagnosis and management    Dwight Rivera MD  Director, Adolescent Young Adult [address] [place] [address] and Blood Disorders Program  [id number] [place] [address], [address] [place] [address]  AYA Phone           May 01, 1998; 50 (5) ARTICLES  Role of anticardiolipin antibodies in young persons with migraine and transient focal neurologic events  A prospective study  Anticardiolipin antibodies (aCL) are a risk factor for cerebral ischemia. In migraine, the association is controversial, with widely varying results in different small series. The controversy in part may be due to the inherent difficulty in distinguishing the transient focal neurologic events (TFNE) of migraine from TIA.  To assess the frequency of aCL in migraine, we prospectively evaluated consecutive adults under 61years of age with migraine without aura and with recent TFNE (<24-hour duration) clinically suggestive of either migraine with aura or TIA. We concomitantly enrolled persons with no CNS disease. Each person was interviewed and had blood drawn for solid-phase NOÉ with IgG and IgM aCL isotyping. Neuroradiologic studies were reviewed. Patients with TFNE were followed every 6 months for the duration of the 3-year study. The frequency of aCL positivity (IgG >20, IgG >40, IgM >7.5) for the 645 patients with TFNE (8.8, 3.1, 4.2%), the 518 persons in the TFNE subgroup with migraine with aura (8.9, 3.3, 4.1%), the 497 persons with migraine without aura (7.0, 2.0, 3.6%), and the 366 control subjects (9.3, 3.6, 3.9%) did not differ significantly between groups. In TFNE patients with elevated aCL titer, the association was positive with diabetes mellitus, TFNE duration <15 minutes, and diplopia and was negative with hemiparesis, tinnitus, and family history of stroke. Findings on imaging consistent with cerebral ischemia were more frequent in aCL-positive persons. The short-term risk of stroke was uniformly low. In young persons, aCL is not associated with migraine or with TFNE, although diabetes mellitus, negative family history of stroke, and brief duration of symptoms (including diplopia) may predict immunoreactivity. Imaging studies suggest an ischemic etiology of TFNE in this cohort. The relevance of \"non-criteria\" clinical manifestations of antiphospholipid syndrome: 14th International Congress on Antiphospholipid Antibodies Technical Task Force Report on Antiphospholipid Syndrome Clinical Features. AU  Minh ROSSI, Maury Gamble MC, Robert FRANCISCO, Paul PEDROZA, [employer], Hernandez V, Keyon S, Lito Duffy, Bernard M, Mitchell MONTALVO   SO  Autoimmun Rev. 2015 May;14(5):401-14.  Epub 2015 Jan 29.      The purpose of this task force was to critically analyze nine non-criteria manifestations of APS to support their inclusion as APS classification criteria. The Task Force Members selected the non-criteria clinical manifestations according to their clinical relevance, that is, the patient-important outcome from clinician perspective. They included superficial vein thrombosis, thrombocytopenia, renal microangiopathy, heart valve disease, livedo reticularis, migraine, chorea, seizures and myelitis, which were reviewed by this International Task Force collaboration, in addition to the seronegative APS (SN-APS). GRADE system was used to evaluate the quality of evidence of medical literature of each selected item. This critical appraisal exercise aimed to support the debate regarding the clinical picture of APS. We found that the overall GRADE analysis was very low for migraine and seizures, low for superficial venous thrombosis, thrombocytopenia, chorea, longitudinal myelitis and the so-called seronegative APS and moderate for APS nephropathy, heart valve lesions and livedo reticularis. The next step can be a critical redefinition of an APS gold standard, for instance derived from the [facility] registry that will include not only current APS patients but also those with antiphospholipid antibodies not meeting current classification criteria.

## 2022-06-07 NOTE — PATIENT INSTRUCTIONS
Headache Education:   Instructed the patient on over-the-counter headache management medications including: CoQ10, magnesium oxide, and butterbur. To avoid a pain medication overuse headache trying not to take pain medicines more than 3 doses a week. To help relieve headache symptoms without taking pain medicine lie down under darkroom and drink glass of water. Consider lifestyle modification including good sleep hygiene, routine medial schedules, regular exercise and managing triggers. Keep a headache diary  to reveal triggers and possible patterns. Triggers may be: Food, stress, perfumes, alcohol, or even chocolate. Drink plenty of water and try to get 8 hours of sleep each night to reduce risk factors that may cause headaches.

## 2022-06-07 NOTE — PROGRESS NOTES
Per Dr Kenya Mcgrath:   If Lupus anticoagulant and PATRICIA are negative then no follow up with hematology. If Lupus anticoagulant and/or PATRICIA are positive, then follow up with hematology in 6-12 months. Continue Aspirin per Neurology.

## 2022-06-08 LAB
CARDIOLIPIN IGA SER IA-ACNC: <9 APL U/ML (ref 0–11)
CARDIOLIPIN IGG SER IA-ACNC: 14 GPL U/ML (ref 0–14)
CARDIOLIPIN IGM SER IA-ACNC: 9 MPL U/ML (ref 0–12)

## 2022-06-10 DIAGNOSIS — D68.61 APS (ANTIPHOSPHOLIPID SYNDROME) (HCC): ICD-10-CM

## 2022-06-10 DIAGNOSIS — R76.0 ANTICARDIOLIPIN ANTIBODY POSITIVE: Primary | ICD-10-CM

## 2022-06-10 NOTE — PROGRESS NOTES
Lupus anticoagulant panel not drawn in sample on 6-7-22. Discussed with lab. Unable to add on. Need for redraw. Cardiolipin ab panel wnl. Called pt to discuss. No answer. Left voicemail for call back. My number provided.

## 2022-06-14 ENCOUNTER — TELEPHONE (OUTPATIENT)
Dept: NEUROLOGY | Age: 27
End: 2022-06-14

## 2022-06-21 ENCOUNTER — TELEPHONE (OUTPATIENT)
Dept: ONCOLOGY | Age: 27
End: 2022-06-21

## 2022-06-22 ENCOUNTER — HOSPITAL ENCOUNTER (OUTPATIENT)
Dept: LAB | Age: 27
Discharge: HOME | End: 2022-06-25
Payer: COMMERCIAL

## 2022-06-22 DIAGNOSIS — R76.0 ANTICARDIOLIPIN ANTIBODY POSITIVE: ICD-10-CM

## 2022-06-22 DIAGNOSIS — D68.61 APS (ANTIPHOSPHOLIPID SYNDROME) (HCC): ICD-10-CM

## 2022-06-22 PROCEDURE — 85705 THROMBOPLASTIN INHIBITION: CPT

## 2022-06-22 PROCEDURE — 36415 COLL VENOUS BLD VENIPUNCTURE: CPT

## 2022-06-23 LAB
APTT SCREEN TO CONFIRM RATIO: 1.29 RATIO (ref 0–1.34)
CONFIRM APTT/NORMAL: 46 SEC (ref 0–47.6)
DRVVT RATIO: 1.9 RATIO (ref 0.8–1.2)
LA 2 SCREEN W REFLEX-IMP: ABNORMAL
MIXING DRVVT: 52.6 SEC (ref 0–40.4)
SCREEN APTT: 49.3 SEC (ref 0–51.9)
SCREEN DRVVT: 63.8 SEC (ref 0–47)
THROMBIN TIME: 16.2 SEC (ref 0–23)

## 2022-06-24 DIAGNOSIS — D68.61 APS (ANTIPHOSPHOLIPID SYNDROME) (HCC): ICD-10-CM

## 2022-06-24 DIAGNOSIS — R76.0 ANTICARDIOLIPIN ANTIBODY POSITIVE: Primary | ICD-10-CM

## 2022-07-05 ENCOUNTER — TELEPHONE (OUTPATIENT)
Dept: ONCOLOGY | Age: 27
End: 2022-07-05